# Patient Record
Sex: MALE | Race: WHITE | NOT HISPANIC OR LATINO
[De-identification: names, ages, dates, MRNs, and addresses within clinical notes are randomized per-mention and may not be internally consistent; named-entity substitution may affect disease eponyms.]

---

## 2018-04-17 ENCOUNTER — RECORDS - HEALTHEAST (OUTPATIENT)
Dept: ADMINISTRATIVE | Facility: OTHER | Age: 78
End: 2018-04-17

## 2018-04-21 ENCOUNTER — RECORDS - HEALTHEAST (OUTPATIENT)
Dept: LAB | Facility: HOSPITAL | Age: 78
End: 2018-04-21

## 2018-04-21 LAB
FOLATE SERPL-MCNC: 17 NG/ML
VIT B12 SERPL-MCNC: 741 PG/ML (ref 213–816)

## 2018-04-23 LAB — ANA SER QL: 0.1 U

## 2018-04-24 LAB
ALBUMIN PERCENT: 64.9 % (ref 51–67)
ALBUMIN SERPL ELPH-MCNC: 4.7 G/DL (ref 3.2–4.7)
ALPHA 1 PERCENT: 2.1 % (ref 2–4)
ALPHA 2 PERCENT: 11.3 % (ref 5–13)
ALPHA1 GLOB SERPL ELPH-MCNC: 0.2 G/DL (ref 0.1–0.3)
ALPHA2 GLOB SERPL ELPH-MCNC: 0.8 G/DL (ref 0.4–0.9)
B-GLOBULIN SERPL ELPH-MCNC: 0.9 G/DL (ref 0.7–1.2)
BETA PERCENT: 12.6 % (ref 10–17)
GAMMA GLOB SERPL ELPH-MCNC: 0.7 G/DL (ref 0.6–1.4)
GAMMA GLOBULIN PERCENT: 9.1 % (ref 9–20)
PATH ICD:: NORMAL
PATH ICD:: NORMAL
PROT PATTERN SERPL ELPH-IMP: NORMAL
PROT PATTERN SERPL IFE-IMP: NORMAL
PROT SERPL-MCNC: 7.2 G/DL (ref 6–8)
REVIEWING PATHOLOGIST: NORMAL
REVIEWING PATHOLOGIST: NORMAL

## 2018-04-30 ENCOUNTER — HOSPITAL ENCOUNTER (OUTPATIENT)
Dept: MRI IMAGING | Facility: HOSPITAL | Age: 78
Discharge: HOME | End: 2018-04-30
Attending: PSYCHIATRY & NEUROLOGY

## 2018-04-30 DIAGNOSIS — G60.8 MIXED SENSORY-MOTOR POLYNEUROPATHY: ICD-10-CM

## 2018-04-30 DIAGNOSIS — M48.061 LUMBAR STENOSIS: ICD-10-CM

## 2018-04-30 DIAGNOSIS — M54.12 RIGHT CERVICAL RADICULOPATHY: ICD-10-CM

## 2018-05-07 ENCOUNTER — RECORDS - HEALTHEAST (OUTPATIENT)
Dept: ADMINISTRATIVE | Facility: OTHER | Age: 78
End: 2018-05-07

## 2018-05-09 ENCOUNTER — AMBULATORY - HEALTHEAST (OUTPATIENT)
Dept: NEUROSURGERY | Facility: CLINIC | Age: 78
End: 2018-05-09

## 2018-05-09 DIAGNOSIS — M54.9 BACK PAIN: ICD-10-CM

## 2018-05-11 ENCOUNTER — AMBULATORY - HEALTHEAST (OUTPATIENT)
Dept: NEUROSURGERY | Facility: CLINIC | Age: 78
End: 2018-05-11

## 2018-05-15 ENCOUNTER — RECORDS - HEALTHEAST (OUTPATIENT)
Dept: GENERAL RADIOLOGY | Facility: CLINIC | Age: 78
End: 2018-05-15

## 2018-05-15 ENCOUNTER — OFFICE VISIT - HEALTHEAST (OUTPATIENT)
Dept: NEUROSURGERY | Facility: CLINIC | Age: 78
End: 2018-05-15

## 2018-05-15 DIAGNOSIS — M54.9 DORSALGIA, UNSPECIFIED: ICD-10-CM

## 2018-05-15 DIAGNOSIS — B99.9 INFECTION: ICD-10-CM

## 2018-05-15 RX ORDER — ROSUVASTATIN CALCIUM 10 MG/1
10 TABLET, COATED ORAL AT BEDTIME
Status: SHIPPED | COMMUNITY
Start: 2018-04-30

## 2018-05-15 RX ORDER — GLUCOSAMINE HCL/CHONDROITIN SU 500-400 MG
CAPSULE ORAL
Status: SHIPPED | COMMUNITY
Start: 2017-06-12

## 2018-05-15 ASSESSMENT — MIFFLIN-ST. JEOR: SCORE: 1724.16

## 2018-05-23 ENCOUNTER — COMMUNICATION - HEALTHEAST (OUTPATIENT)
Dept: NEUROSURGERY | Facility: CLINIC | Age: 78
End: 2018-05-23

## 2018-05-31 ENCOUNTER — RECORDS - HEALTHEAST (OUTPATIENT)
Dept: ADMINISTRATIVE | Facility: OTHER | Age: 78
End: 2018-05-31

## 2018-06-12 ENCOUNTER — AMBULATORY - HEALTHEAST (OUTPATIENT)
Dept: NEUROSURGERY | Facility: CLINIC | Age: 78
End: 2018-06-12

## 2018-06-18 ENCOUNTER — OFFICE VISIT - HEALTHEAST (OUTPATIENT)
Dept: NEUROSURGERY | Facility: CLINIC | Age: 78
End: 2018-06-18

## 2018-06-18 DIAGNOSIS — Z01.818 PRE-OP EVALUATION: ICD-10-CM

## 2018-06-18 LAB — C REACTIVE PROTEIN LHE: 0.2 MG/DL (ref 0–0.8)

## 2018-06-19 ENCOUNTER — HOME CARE/HOSPICE - HEALTHEAST (OUTPATIENT)
Dept: HOME HEALTH SERVICES | Facility: HOME HEALTH | Age: 78
End: 2018-06-19

## 2018-06-20 ENCOUNTER — COMMUNICATION - HEALTHEAST (OUTPATIENT)
Dept: NEUROSURGERY | Facility: CLINIC | Age: 78
End: 2018-06-20

## 2018-06-22 ASSESSMENT — MIFFLIN-ST. JEOR: SCORE: 1741.38

## 2018-06-23 ENCOUNTER — COMMUNICATION - HEALTHEAST (OUTPATIENT)
Dept: NEUROLOGY | Facility: CLINIC | Age: 78
End: 2018-06-23

## 2018-06-25 ENCOUNTER — ANESTHESIA - HEALTHEAST (OUTPATIENT)
Dept: SURGERY | Facility: CLINIC | Age: 78
End: 2018-06-25

## 2018-06-26 ENCOUNTER — SURGERY - HEALTHEAST (OUTPATIENT)
Dept: SURGERY | Facility: CLINIC | Age: 78
End: 2018-06-26

## 2018-06-26 ASSESSMENT — MIFFLIN-ST. JEOR
SCORE: 1652.95
SCORE: 1681.07

## 2018-06-28 ENCOUNTER — COMMUNICATION - HEALTHEAST (OUTPATIENT)
Dept: NEUROSURGERY | Facility: CLINIC | Age: 78
End: 2018-06-28

## 2018-06-28 ASSESSMENT — MIFFLIN-ST. JEOR: SCORE: 1741.19

## 2018-06-29 ENCOUNTER — HOME CARE/HOSPICE - HEALTHEAST (OUTPATIENT)
Dept: HOME HEALTH SERVICES | Facility: HOME HEALTH | Age: 78
End: 2018-06-29

## 2018-06-30 ASSESSMENT — MIFFLIN-ST. JEOR: SCORE: 1745.71

## 2018-07-02 ENCOUNTER — AMBULATORY - HEALTHEAST (OUTPATIENT)
Dept: NEUROSURGERY | Facility: CLINIC | Age: 78
End: 2018-07-02

## 2018-07-02 DIAGNOSIS — Z98.890 STATUS POST LUMBAR SPINE SURGERY FOR DECOMPRESSION OF SPINAL CORD: ICD-10-CM

## 2018-07-09 ENCOUNTER — AMBULATORY - HEALTHEAST (OUTPATIENT)
Dept: NEUROSURGERY | Facility: CLINIC | Age: 78
End: 2018-07-09

## 2018-08-06 ENCOUNTER — OFFICE VISIT - HEALTHEAST (OUTPATIENT)
Dept: NEUROSURGERY | Facility: CLINIC | Age: 78
End: 2018-08-06

## 2018-08-06 DIAGNOSIS — M48.062 SPINAL STENOSIS OF LUMBAR REGION WITH NEUROGENIC CLAUDICATION: ICD-10-CM

## 2018-08-06 ASSESSMENT — MIFFLIN-ST. JEOR: SCORE: 1695.82

## 2018-08-23 ENCOUNTER — RECORDS - HEALTHEAST (OUTPATIENT)
Dept: ADMINISTRATIVE | Facility: OTHER | Age: 78
End: 2018-08-23

## 2018-10-30 ENCOUNTER — RECORDS - HEALTHEAST (OUTPATIENT)
Dept: ADMINISTRATIVE | Facility: OTHER | Age: 78
End: 2018-10-30

## 2018-11-02 ENCOUNTER — RECORDS - HEALTHEAST (OUTPATIENT)
Dept: ADMINISTRATIVE | Facility: OTHER | Age: 78
End: 2018-11-02

## 2019-04-23 ENCOUNTER — RECORDS - HEALTHEAST (OUTPATIENT)
Dept: ADMINISTRATIVE | Facility: OTHER | Age: 79
End: 2019-04-23

## 2019-04-23 ENCOUNTER — HOSPITAL ENCOUNTER (OUTPATIENT)
Dept: ULTRASOUND IMAGING | Facility: CLINIC | Age: 79
Discharge: HOME | End: 2019-04-23
Attending: PHYSICIAN ASSISTANT

## 2019-04-23 ENCOUNTER — COMMUNICATION - HEALTHEAST (OUTPATIENT)
Dept: TELEHEALTH | Facility: CLINIC | Age: 79
End: 2019-04-23

## 2019-04-23 DIAGNOSIS — M79.662 PAIN OF LEFT CALF: ICD-10-CM

## 2021-06-01 VITALS — BODY MASS INDEX: 31.22 KG/M2 | WEIGHT: 223 LBS | HEIGHT: 71 IN

## 2021-06-01 VITALS — WEIGHT: 220 LBS | HEIGHT: 70 IN | BODY MASS INDEX: 31.5 KG/M2

## 2021-06-01 VITALS — WEIGHT: 212 LBS | BODY MASS INDEX: 29.68 KG/M2 | HEIGHT: 71 IN

## 2021-06-16 PROBLEM — M71.38 SYNOVIAL CYST OF LUMBAR SPINE: Status: ACTIVE | Noted: 2018-06-26

## 2021-06-16 PROBLEM — R33.8 POSTOPERATIVE URINARY RETENTION: Status: ACTIVE | Noted: 2018-07-16

## 2021-06-16 PROBLEM — K56.0 PARALYTIC ILEUS (H): Status: ACTIVE | Noted: 2018-06-29

## 2021-06-16 PROBLEM — N99.89 POSTOPERATIVE URINARY RETENTION: Status: ACTIVE | Noted: 2018-07-16

## 2021-06-18 NOTE — PROGRESS NOTES
"Neurosurgery consultation was requested by: Dr. Anderson for evaluation of lumbar stenosis at L2-3 and L4-5  Pain: is minimal in the back  Radicular Pain is present: in bilateral buttocks and posterior thigh as a burning sharp with walking  Lhermitte sign: denies  Motor complaints: \"my legs feel heavy\"  Sensory complaints: burning sensation in posterior thigh  Gait and balance issues: imbalance, shopping cart sign  Bowel or bladder issues: denies incontinence or saddle anesthesia  Duration of SX is: for 6 months  The symptoms are worse with: standing and walking  The symptoms are better with: sitting and laying  Injury: denies  Severity is: moderate  Patient has tried the following conservative measures: analgesics  JOHNIE score is: 30%  Ting Whitley RN, CNRN      "

## 2021-06-18 NOTE — ANESTHESIA PROCEDURE NOTES
Arterial Line  Reason for Procedure: hemodynamic monitoring  Patient location during procedure: Pre-op  Start time: 6/26/2018 10:30 AM  End time: 6/26/2018 10:35 AM  Staffing:  Performing  Anesthesiologist: CHAY FINNEY  Sterile Precautions:  sterile barriers used during insertion: cap, mask, sterile gloves, large sheet, and hand hygiene used.  Arterial Line:   Immediately prior to procedure a time out was called to verify the correct patient, procedure, equipment, support staff and site/side marked as required  Laterality: left  Location: radial  Prepped with: ChloroPrep    Needle gauge: 20 G  Number of Attempts: 1  Secured with: tape, transparent dressing and pressure dressing  Flushed with: saline  1% lidocaine local anesthesia used for skin prep.   See MAR for additional medications given.  Ultrasound evaluation of access site: yes  Vessel patent by US exam    Concurrent real time visualization of needle entry

## 2021-06-18 NOTE — PROGRESS NOTES
NEUROSURGERY CONSULTATION NOTE    5/14/2018     Lewis Lema is a 77 y.o. male who is sent to us in consultation by Helio Anderson    for evaluation of his claudication.  He has pain in his left > Right pain which is posterior thigh and buttock stops at the knee (knees feel weak).   Pain comes on with standing and gone with sitting and laying.  Leaning over like on his walker makes him able to go further.  Multiple trips to BR at night, slow stream better if leaning /sitting.  No bladder infections.         Also has trouble with balance, bounces off walls.  Uses a cane.  Numbness in his R hand once in a  Awhile.  Pain along right shoulder with turning his head to the Right.      Occassional CTS.      HPI:  Diabetes, takes metformin, insulin, x 11 years.      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   No past surgical history on file.      REVIEW OF SYSTEMS:  ROS reviewed with pt as documented on pt health form of 5/14/2018.    Negative cardiac, pulmonary, hematological     .  No family hx of anesthetic reactions  .  No family hx of hypercoagulability .       MEDICATIONS:  No current outpatient prescriptions on file.     No current facility-administered medications for this visit.          ALLERGIES/SENSITIVITIES:     Allergies not on file    PERTINENT SOCIAL HISTORY:    Social History     Social History     Marital status:      Spouse name: N/A     Number of children: N/A     Years of education: N/A     Social History Main Topics     Smoking status: Not on file     Smokeless tobacco: Not on file     Alcohol use Not on file     Drug use: Not on file     Sexual activity: Not on file     Other Topics Concern     Not on file     Social History Narrative         FAMILY HISTORY: adopted   No family history on file.     PHYSICAL EXAM:   Constitutional: There were no vitals taken for this visit.     Mental Status: A & O in no acute distress.  Affect is appropriate.   Speech is fluent.  Recent and remote memory are intact.  Attention span and concentration are normal.     Cranial Nerves: CN1: grossly intact per patient recall. CN2: No funduscopic exam performed. CN3,4 & 6: Pupillary light response, lateral and vertical gaze normal.  No nystagmus.  Visual fields are full to confrontation. CN5: Intact to touch CN7: No facial weakness, smile, facial symmetry intact. CN8: Intact to spoken voice. CN9&10: Gag reflex, uvula midline, palate rises with phonation. CN11: Shoulder shrug 5/5 intact bilaterally. CN12: Tongue midline and moves freely from side to side.     Motor: No pronator drift of upper extremity. Normal bulk and tone all muscle groups of upper and lower extremities. Can't toe walk and can't heal walk.     Sensory: Sensation intact bilaterally to light touch.  Decreased sensation stocking distribution     Coordination:  Heel/toe/  gait intact.    Unsteady  tandem gait      Reflexes; supinator, biceps, triceps spreading , knee brisk  ankle jerk present no   hoffmans/ no    babinski/ clonus.    SLR negative.     IMAGING: I personally reviewed all radiographic images    MRI lumbar spine with severe stenosis at L23 and L45 also stenosis at L34, and L12      Flex/ext  No instability      CONSULTATION ASSESSMENT AND PLAN:  Pt with severe lumbar stenosis and neurogenic claudication needs a decompressive laminectomy from L1-L5 .   We discussed risks and benefits as well as expected course and he would like to proceed.   His wife will call the retinologist to make sure that there is no problem with a long prone case after his long ago retinal detachment and banding.     We will take special precautions for the cervical spine where he has cord compression which will need to be fixed up the line.     I spent more than 45 minutes in this apt, examining the pt, reviewing the scans, reviewing notes from chart, discussing treatment options with risks and benefits and coordinating care. >50  % clinic time was spent in face to face counseling and coordinating care    Starr Sequeira       Cc:   Alley Pastor, CNP  [address]  [place] [address]

## 2021-06-18 NOTE — PROGRESS NOTES
Preop Assessment: Lewis TAIWO Lema presents for pre-op review.  Surgeon: Dr. Sequeira  Name of Surgery: BILATERAL DECOMPRESSIVE LAMINECTOMY LUMBAR 1-LUMBAR 5  Diagnosis: lumbar spinal stenosis  Date of Surgery: 18  Time of Surgery: 1000  Hospital: [facility]  H&P: 18, Sherry Whiting, NP cleared pt for surgery  History of ASA, NSAIDS, vitamin and/or herbal supplements within 10 days: No  History of blood thinners: No  History of anti-seizure med's: No  Review of systems: feels today    Last BM: 18  Hx nausea/vomiting: denies  Hx urinary retention: denies, recently takes longer to get stream going  Pain Management: no red flags  Home PT eval: up independently at home, uses cane or walker due to balance.  Reports balance is his biggest problem.  Will initiate pre op home PT eval (addendum:  18 pt declined starting therapy pre op)  Patient confirmed they have help/assistance in place at home upon discharge:    Diagnostics:  Labs: Per PCP, CRP today normal  CXR: not ordered   [id number]  Films: lumbar MRI 18 in [facility]    All questions answered regarding surgery and expected pre and postoperative course including rehabilitation phase.     Reviewed with patient: Arrive 2.5 hours prior to scheduled surgery, nothing to eat or drink after midnight the night before surgery and bring all pertinent films to the hospital the day of surgery.  Continue to refrain from NSAIDS (Ibuprofen, Aleve, Naprosyn) ASA or over the counter herbal medication or supplements, anticoagulants and blood thinners.    Preop skin preparations and instructions provided.    Surgical consent signed at readiness visit on 18    Roselia Vazquez RN

## 2021-06-18 NOTE — ANESTHESIA POSTPROCEDURE EVALUATION
Patient: Lewis Lema  BILATERAL DECOMPRESSIVE LAMINECTOMY LUMBAR 1-LUMBAR 5, EXCISION SYNOVIAL CYST LEFT LUMBAR 4-5 AND LUMBAR 1-2  Anesthesia type: general    Patient location: PACU  Last vitals:   Vitals:    06/26/18 1800   BP: 177/87   Pulse: 76   Resp: 16   Temp:    SpO2: 95%     Post vital signs: stable  Level of consciousness: awake and responds to simple questions  Post-anesthesia pain: pain controlled  Post-anesthesia nausea and vomiting: no  Pulmonary: unassisted, return to baseline  Cardiovascular: stable and blood pressure at baseline  Hydration: adequate  Anesthetic events: no    QCDR Measures:  ASA# 11 - Marika-op Cardiac Arrest: ASA11B - Patient did NOT experience unanticipated cardiac arrest  ASA# 12 - Marika-op Mortality Rate: ASA12B - Patient did NOT die  ASA# 13 - PACU Re-Intubation Rate: ASA13B - Patient did NOT require a new airway mgmt  ASA# 10 - Composite Anes Safety: ASA10A - No serious adverse event    Additional Notes: Doing well.

## 2021-06-18 NOTE — ANESTHESIA CARE TRANSFER NOTE
Last vitals:   Vitals:    06/26/18 1659   BP: 138/66   Pulse: 68   Resp: 16   Temp: 36.9  C (98.5  F)   SpO2: 99%     Patient's level of consciousness is drowsy  Spontaneous respirations: yes  Maintains airway independently: yes  Dentition unchanged: yes  Oropharynx: oropharynx clear of all foreign objects    QCDR Measures:  ASA# 20 - Surgical Safety Checklist: WHO surgical safety checklist completed prior to induction  PQRS# 430 - Adult PONV Prevention: 4558F - Pt received => 2 anti-emetic agents (different classes) preop & intraop  ASA# 8 - Peds PONV Prevention: NA - Not pediatric patient, not GA or 2 or more risk factors NOT present  PQRS# 424 - Marika-op Temp Management: 4559F - At least one body temp DOCUMENTED => 35.5C or 95.9F within required timeframe  PQRS# 426 - PACU Transfer Protocol: - Transfer of care checklist used  ASA# 14 - Acute Post-op Pain: ASA14B - Patient did NOT experience pain >= 7 out of 10

## 2021-06-19 NOTE — PROGRESS NOTES
Lewis Lema is status post  Removal of synovial cyst on left L45 with    decompressive lumbar laminectomy, medial facetectomy, foramenotomy; Removal of synovial cyst on left L12 with    decompressive lumbar laminectomy, medial facetectomy, foramenotomy; Bilateral decompressive laminectomy at L23 and L34 on 6/26/2018 by Dr. Sequeira.  Preoperatively presented with neurogenic claudication, L>R leg and balance issues as well as urinary urgency and frequency.  Today he returns in follow up for staples removal. He is accompanied by his SO. He is doing reasonably well - his le pain is gone, his back discomfort is minimal. Denies sensory disturbance. Uses a walker for safety, reports improved strength in LE. No urinary issues.        Surgical wound WNL - CDI, no signs of infection or skin breakdown.  Incision well-healed: good skin approximation, no redness or visible/palpable edema, no tenderness to palpation.  PT. AF, denies fever, chills or sweats.  Pt. reports that the symptoms are improved from pre-op.    Staples - intact removed without difficulty. Wound prepped with Betadine before and after removal.  Surrounding skin has no signs of breakdown.  Verbal instructions regarding incision care are given.  Pt. advised to call us if any s/s of infection noted - all discussed in details.

## 2021-06-19 NOTE — PROGRESS NOTES
"CHART NOTE     DATE OF SERVICE:  2018     : 1940   77 y.o.     ASSESSMENT :  Doing well with improvement in symptoms and function.      PLAN:  Loosen restrictions. Refer to PT. RTC prn. Enc to call with any new questions or concerns.     HPI:  Lewis Lema is status post FIONA DECOMPRESSIVE LAMINECTOMY L1-L 5, EXCISION SYNOVIAL CYST LEFT L 4-5 AND L 1-2 on 2018 by Dr. Sequeira.  Preoperatively presented with neurogenic claudication, L>R leg and balance issues as well as urinary urgency and frequency. Home with crane.  Post op ileus delayed dc (2018).     TODAY, Lewis Lema reports very little leg pain, some mild residual buttocks discomfort-positional.  Walking with walker and cane, balance is off due to DM and has diabetic neuropathy, cannot feel his feet well.   Most of his discomfort is d/t hemorrhoids and needs to address this.   Now having normal BM, had crane removed at [facility].       PAST MEDICAL HISTORY, SURGICAL HISTORY, REVIEW OF SYMPTOMS, MEDICATIONS AND ALLERGIES:  Past medical history, surgical history, ROS, medications and allergies reviewed with patient and remain unchanged from previous visit.    Past Medical History:   Diagnosis Date     Bacteremia     MSSA     Diabetes mellitus (H)      Hearing loss      HLD (hyperlipidemia)      Lumbar spinal stenosis      Osteomyelitis (H)     ankle/foot     Postoperative nausea        PHYSICAL EXAM:    /69  Pulse 86  Ht 5' 10.5\" (1.791 m)  Wt 212 lb (96.2 kg)  SpO2 97%  BMI 29.99 kg/m2    Neurological exam reveals:  Respirations easy, non-labored.   Skin: W/D/I. No rashes, lesions or breaks in integrity.   Recent and remote memory intact, fund of knowledge wnl.    Alert and oriented x3, speech fluent and appropriate.   PERRL, EOMI, No nystagmus,   Face symmetric, tongue midline, Uvula midline,  palate rises with phonation   Shoulder shrug equal  Leg strength bilateral dorsiflexion, plantar flexion, and " hip flexion 5/5 to exam   No extremity edema noted.    Muscle Bulk and tone wnl.   Reflexes: No pathological reflexes   Gait and station:Assistive Devices, walker. Unsteady without walker.   Incision: CDI without erythema or edema  NDI/JOHNIE: 0%     RADIOGRAPHIC IMAGING:  NA

## 2021-07-03 NOTE — ADDENDUM NOTE
Addendum Note by Roselia Melendrez RN at 6/20/2018 11:12 AM     Author: Roselia Melendrez RN Service: -- Author Type: Registered Nurse    Filed: 6/20/2018 11:12 AM Encounter Date: 6/18/2018 Status: Signed    : Roselia Melendrez RN (Registered Nurse)    Addended by: ROSELIA MELENDREZ on: 6/20/2018 11:12 AM        Modules accepted: Orders

## 2021-07-03 NOTE — ANESTHESIA PREPROCEDURE EVALUATION
Anesthesia Preprocedure Evaluation by Candy Foote MD at 6/25/2018  8:35 PM     Author: Candy Foote MD Service: -- Author Type: Physician    Filed: 6/26/2018  9:13 AM Date of Service: 6/25/2018  8:35 PM Status: Addendum    : Candy Foote MD (Physician)    Related Notes: Original Note by Candy Foote MD (Physician) filed at 6/25/2018  8:39 PM       Anesthesia Evaluation      Patient summary reviewed   No history of anesthetic complications     Airway   Mallampati: II  Neck ROM: full   Pulmonary - normal exam    breath sounds clear to auscultation  (+) a smoker (Former, 44 pack years)                         Cardiovascular - normal exam  Exercise tolerance: > or = 4 METS  (+) , hypercholesterolemia,     (-) murmur  ECG reviewed  Rhythm: regular  Rate: normal,    no murmur      Neuro/Psych      Comments: Lumbar spinal stenosis  Cervical foraminal stenosis    Endo/Other    (+) diabetes mellitus type 2 using insulin, obesity (BMI 31),      GI/Hepatic/Renal - negative ROS      Other findings: Hearing loss    04/30/18 1705 MR Cervical Spine Without Contrast View Image  Impression:   CONCLUSION:  1. No cord signal abnormality.  2. Posterior annular bulge with osteophytic endplate and uncinate spurring at C2-C3 results in effacement of the ventral aspect of the thecal sac and severe right neural foraminal stenosis.  3. Mild spinal canal stenosis C3-C4 with moderate bilateral foraminal stenosis.  4. Mild right neural foraminal stenosis C4-C5.  5. Severe right neural foraminal stenosis C5-C6.  6. Moderate bilateral foraminal stenosis C6-C7.  7. Exam otherwise negative.         04/30/18 1705 MR Lumbar Spine Without Contrast View Image  Impression:   CONCLUSION:  1. Degenerative and spondylotic changes throughout the lumbar spine accentuated by convex left lumbar curvature and congenitally short pedicles, which result in a narrow central canal.  2. Moderate spinal canal stenosis seen more focally on the left  L5-S1.  3. Severe spinal canal stenosis L4-L5 with severe bilateral foraminal stenosis.  4. Moderate severity spinal canal stenosis L3-L4 with severe right neural foraminal stenosis L3-L4.  5. Severe spinal canal stenosis L2-L3 with mild right and minimal left neural foraminal stenosis.  6. Mild spinal canal stenosis L1-L2 with moderate to severe left neural foraminal stenosis.  7. Facet hypertrophy throughout the lumbar spine.           Dental    (+) upper dentures                           Anesthesia Plan  Planned anesthetic: general endotracheal  Glidescope, neutral neck intubation    Ketamine 50 mg IV   Magnesium 1 gram/hour x 4 hours  Precedex  Phenylephrine inline for induction  Maintain MAP above 85    Arterial line per surgeon's request  ASA 3   Induction: intravenous   Anesthetic plan and risks discussed with: patient  Anesthesia plan special considerations: video-assisted, antiemetics, arterial catheterization, dexmedetomidine  Post-op plan: routine recovery

## 2021-07-03 NOTE — ADDENDUM NOTE
Addendum Note by Roselia Melendrez RN at 6/18/2018  6:57 PM     Author: Roselia Melendrez RN Service: -- Author Type: Registered Nurse    Filed: 6/18/2018  6:57 PM Encounter Date: 6/18/2018 Status: Signed    : Roselia Melendrez RN (Registered Nurse)    Addended by: ROSELIA MELENDREZ on: 6/18/2018 06:57 PM        Modules accepted: Orders